# Patient Record
Sex: FEMALE | HISPANIC OR LATINO | Employment: UNEMPLOYED | URBAN - METROPOLITAN AREA
[De-identification: names, ages, dates, MRNs, and addresses within clinical notes are randomized per-mention and may not be internally consistent; named-entity substitution may affect disease eponyms.]

---

## 2022-04-19 ENCOUNTER — APPOINTMENT (OUTPATIENT)
Dept: GENERAL RADIOLOGY | Facility: HOSPITAL | Age: 57
End: 2022-04-19

## 2022-04-19 ENCOUNTER — HOSPITAL ENCOUNTER (OUTPATIENT)
Facility: HOSPITAL | Age: 57
Setting detail: OBSERVATION
Discharge: HOME | End: 2022-04-20
Attending: EMERGENCY MEDICINE | Admitting: HOSPITALIST

## 2022-04-19 ENCOUNTER — APPOINTMENT (OUTPATIENT)
Dept: CT IMAGING | Facility: HOSPITAL | Age: 57
End: 2022-04-19

## 2022-04-19 DIAGNOSIS — R10.84 GENERALIZED ABDOMINAL PAIN: Primary | ICD-10-CM

## 2022-04-19 DIAGNOSIS — R11.2 NAUSEA AND VOMITING, UNSPECIFIED VOMITING TYPE: ICD-10-CM

## 2022-04-19 DIAGNOSIS — E11.65 TYPE 2 DIABETES MELLITUS WITH HYPERGLYCEMIA, WITHOUT LONG-TERM CURRENT USE OF INSULIN: ICD-10-CM

## 2022-04-19 LAB
ALBUMIN SERPL-MCNC: 4.5 G/DL (ref 3.5–5.2)
ALBUMIN/GLOB SERPL: 1.5 G/DL
ALP SERPL-CCNC: 155 U/L (ref 39–117)
ALT SERPL W P-5'-P-CCNC: 18 U/L (ref 1–33)
ANION GAP SERPL CALCULATED.3IONS-SCNC: 12.8 MMOL/L (ref 5–15)
AST SERPL-CCNC: 18 U/L (ref 1–32)
BASOPHILS # BLD AUTO: 0.05 10*3/MM3 (ref 0–0.2)
BASOPHILS NFR BLD AUTO: 0.5 % (ref 0–1.5)
BILIRUB SERPL-MCNC: 0.2 MG/DL (ref 0–1.2)
BILIRUB UR QL STRIP: NEGATIVE
BUN SERPL-MCNC: 12 MG/DL (ref 6–20)
BUN/CREAT SERPL: 17.4 (ref 7–25)
CALCIUM SPEC-SCNC: 9.6 MG/DL (ref 8.6–10.5)
CHLORIDE SERPL-SCNC: 99 MMOL/L (ref 98–107)
CLARITY UR: CLEAR
CO2 SERPL-SCNC: 25.2 MMOL/L (ref 22–29)
COLOR UR: YELLOW
CREAT SERPL-MCNC: 0.69 MG/DL (ref 0.57–1)
DEPRECATED RDW RBC AUTO: 42.4 FL (ref 37–54)
EGFRCR SERPLBLD CKD-EPI 2021: 103.3 ML/MIN/1.73
EOSINOPHIL # BLD AUTO: 0.15 10*3/MM3 (ref 0–0.4)
EOSINOPHIL NFR BLD AUTO: 1.5 % (ref 0.3–6.2)
ERYTHROCYTE [DISTWIDTH] IN BLOOD BY AUTOMATED COUNT: 13.1 % (ref 12.3–15.4)
GLOBULIN UR ELPH-MCNC: 3 GM/DL
GLUCOSE SERPL-MCNC: 343 MG/DL (ref 65–99)
GLUCOSE UR STRIP-MCNC: ABNORMAL MG/DL
HCT VFR BLD AUTO: 44.7 % (ref 34–46.6)
HGB BLD-MCNC: 14.8 G/DL (ref 12–15.9)
HGB UR QL STRIP.AUTO: NEGATIVE
IMM GRANULOCYTES # BLD AUTO: 0.04 10*3/MM3 (ref 0–0.05)
IMM GRANULOCYTES NFR BLD AUTO: 0.4 % (ref 0–0.5)
INR PPP: 0.89 (ref 0.9–1.1)
KETONES UR QL STRIP: ABNORMAL
LEUKOCYTE ESTERASE UR QL STRIP.AUTO: NEGATIVE
LIPASE SERPL-CCNC: 25 U/L (ref 13–60)
LYMPHOCYTES # BLD AUTO: 1.49 10*3/MM3 (ref 0.7–3.1)
LYMPHOCYTES NFR BLD AUTO: 15.1 % (ref 19.6–45.3)
MAGNESIUM SERPL-MCNC: 1.7 MG/DL (ref 1.6–2.6)
MCH RBC QN AUTO: 29.4 PG (ref 26.6–33)
MCHC RBC AUTO-ENTMCNC: 33.1 G/DL (ref 31.5–35.7)
MCV RBC AUTO: 88.7 FL (ref 79–97)
MONOCYTES # BLD AUTO: 0.36 10*3/MM3 (ref 0.1–0.9)
MONOCYTES NFR BLD AUTO: 3.7 % (ref 5–12)
NEUTROPHILS NFR BLD AUTO: 7.75 10*3/MM3 (ref 1.7–7)
NEUTROPHILS NFR BLD AUTO: 78.8 % (ref 42.7–76)
NITRITE UR QL STRIP: NEGATIVE
NRBC BLD AUTO-RTO: 0 /100 WBC (ref 0–0.2)
PH UR STRIP.AUTO: <=5 [PH] (ref 5–8)
PLATELET # BLD AUTO: 309 10*3/MM3 (ref 140–450)
PMV BLD AUTO: 9.7 FL (ref 6–12)
POTASSIUM SERPL-SCNC: 4.3 MMOL/L (ref 3.5–5.2)
PROT SERPL-MCNC: 7.5 G/DL (ref 6–8.5)
PROT UR QL STRIP: NEGATIVE
PROTHROMBIN TIME: 12 SECONDS (ref 11.7–14.2)
RBC # BLD AUTO: 5.04 10*6/MM3 (ref 3.77–5.28)
SODIUM SERPL-SCNC: 137 MMOL/L (ref 136–145)
SP GR UR STRIP: >=1.03 (ref 1–1.03)
TROPONIN T SERPL-MCNC: <0.01 NG/ML (ref 0–0.03)
UROBILINOGEN UR QL STRIP: ABNORMAL
WBC NRBC COR # BLD: 9.84 10*3/MM3 (ref 3.4–10.8)

## 2022-04-19 PROCEDURE — 74177 CT ABD & PELVIS W/CONTRAST: CPT

## 2022-04-19 PROCEDURE — 93010 ELECTROCARDIOGRAM REPORT: CPT | Performed by: INTERNAL MEDICINE

## 2022-04-19 PROCEDURE — 99284 EMERGENCY DEPT VISIT MOD MDM: CPT

## 2022-04-19 PROCEDURE — 85025 COMPLETE CBC W/AUTO DIFF WBC: CPT | Performed by: EMERGENCY MEDICINE

## 2022-04-19 PROCEDURE — 25010000002 IOPAMIDOL 61 % SOLUTION: Performed by: EMERGENCY MEDICINE

## 2022-04-19 PROCEDURE — 83690 ASSAY OF LIPASE: CPT | Performed by: EMERGENCY MEDICINE

## 2022-04-19 PROCEDURE — 84484 ASSAY OF TROPONIN QUANT: CPT | Performed by: EMERGENCY MEDICINE

## 2022-04-19 PROCEDURE — 96375 TX/PRO/DX INJ NEW DRUG ADDON: CPT

## 2022-04-19 PROCEDURE — 81003 URINALYSIS AUTO W/O SCOPE: CPT | Performed by: EMERGENCY MEDICINE

## 2022-04-19 PROCEDURE — 83735 ASSAY OF MAGNESIUM: CPT | Performed by: EMERGENCY MEDICINE

## 2022-04-19 PROCEDURE — 93005 ELECTROCARDIOGRAM TRACING: CPT | Performed by: EMERGENCY MEDICINE

## 2022-04-19 PROCEDURE — 25010000002 ONDANSETRON PER 1 MG: Performed by: EMERGENCY MEDICINE

## 2022-04-19 PROCEDURE — 80053 COMPREHEN METABOLIC PANEL: CPT | Performed by: EMERGENCY MEDICINE

## 2022-04-19 PROCEDURE — 25010000002 HYDROMORPHONE PER 4 MG: Performed by: EMERGENCY MEDICINE

## 2022-04-19 PROCEDURE — 85610 PROTHROMBIN TIME: CPT | Performed by: EMERGENCY MEDICINE

## 2022-04-19 PROCEDURE — 71045 X-RAY EXAM CHEST 1 VIEW: CPT

## 2022-04-19 PROCEDURE — 96374 THER/PROPH/DIAG INJ IV PUSH: CPT

## 2022-04-19 RX ORDER — SODIUM CHLORIDE 0.9 % (FLUSH) 0.9 %
10 SYRINGE (ML) INJECTION AS NEEDED
Status: DISCONTINUED | OUTPATIENT
Start: 2022-04-19 | End: 2022-04-20 | Stop reason: HOSPADM

## 2022-04-19 RX ORDER — HYDROMORPHONE HYDROCHLORIDE 1 MG/ML
0.5 INJECTION, SOLUTION INTRAMUSCULAR; INTRAVENOUS; SUBCUTANEOUS ONCE
Status: COMPLETED | OUTPATIENT
Start: 2022-04-19 | End: 2022-04-19

## 2022-04-19 RX ORDER — ONDANSETRON 2 MG/ML
4 INJECTION INTRAMUSCULAR; INTRAVENOUS ONCE
Status: COMPLETED | OUTPATIENT
Start: 2022-04-19 | End: 2022-04-19

## 2022-04-19 RX ADMIN — ONDANSETRON 4 MG: 2 INJECTION INTRAMUSCULAR; INTRAVENOUS at 22:11

## 2022-04-19 RX ADMIN — SODIUM CHLORIDE 1000 ML: 9 INJECTION, SOLUTION INTRAVENOUS at 22:09

## 2022-04-19 RX ADMIN — HYDROMORPHONE HYDROCHLORIDE 0.5 MG: 1 INJECTION, SOLUTION INTRAMUSCULAR; INTRAVENOUS; SUBCUTANEOUS at 22:12

## 2022-04-19 RX ADMIN — IOPAMIDOL 85 ML: 612 INJECTION, SOLUTION INTRAVENOUS at 23:18

## 2022-04-20 VITALS
HEART RATE: 78 BPM | HEIGHT: 65 IN | WEIGHT: 185 LBS | DIASTOLIC BLOOD PRESSURE: 82 MMHG | OXYGEN SATURATION: 98 % | RESPIRATION RATE: 18 BRPM | SYSTOLIC BLOOD PRESSURE: 126 MMHG | BODY MASS INDEX: 30.82 KG/M2 | TEMPERATURE: 97.6 F

## 2022-04-20 PROBLEM — E11.9 TYPE 2 DIABETES MELLITUS, WITHOUT LONG-TERM CURRENT USE OF INSULIN: Status: ACTIVE | Noted: 2022-04-20

## 2022-04-20 PROBLEM — R10.84 GENERALIZED ABDOMINAL PAIN: Status: ACTIVE | Noted: 2022-04-20

## 2022-04-20 PROBLEM — E66.9 OBESITY (BMI 30-39.9): Status: ACTIVE | Noted: 2022-04-20

## 2022-04-20 PROBLEM — B37.31 VAGINAL YEAST INFECTION: Status: ACTIVE | Noted: 2022-04-20

## 2022-04-20 PROBLEM — E11.65 UNCONTROLLED TYPE 2 DIABETES MELLITUS WITH HYPERGLYCEMIA: Status: ACTIVE | Noted: 2022-04-20

## 2022-04-20 LAB
GLUCOSE BLDC GLUCOMTR-MCNC: 280 MG/DL (ref 70–130)
GLUCOSE BLDC GLUCOMTR-MCNC: 286 MG/DL (ref 70–130)
GLUCOSE BLDC GLUCOMTR-MCNC: 343 MG/DL (ref 70–130)
GLUCOSE BLDC GLUCOMTR-MCNC: 348 MG/DL (ref 70–130)
QT INTERVAL: 338 MS
SARS-COV-2 ORF1AB RESP QL NAA+PROBE: NOT DETECTED

## 2022-04-20 PROCEDURE — G0378 HOSPITAL OBSERVATION PER HR: HCPCS

## 2022-04-20 PROCEDURE — 63710000001 INSULIN LISPRO (HUMAN) PER 5 UNITS: Performed by: NURSE PRACTITIONER

## 2022-04-20 PROCEDURE — 96376 TX/PRO/DX INJ SAME DRUG ADON: CPT

## 2022-04-20 PROCEDURE — 82962 GLUCOSE BLOOD TEST: CPT

## 2022-04-20 PROCEDURE — 25010000002 HYDROMORPHONE PER 4 MG: Performed by: EMERGENCY MEDICINE

## 2022-04-20 PROCEDURE — U0004 COV-19 TEST NON-CDC HGH THRU: HCPCS | Performed by: EMERGENCY MEDICINE

## 2022-04-20 RX ORDER — HYDROMORPHONE HYDROCHLORIDE 1 MG/ML
0.5 INJECTION, SOLUTION INTRAMUSCULAR; INTRAVENOUS; SUBCUTANEOUS ONCE
Status: COMPLETED | OUTPATIENT
Start: 2022-04-20 | End: 2022-04-20

## 2022-04-20 RX ORDER — SODIUM CHLORIDE 0.9 % (FLUSH) 0.9 %
10 SYRINGE (ML) INJECTION AS NEEDED
Status: DISCONTINUED | OUTPATIENT
Start: 2022-04-20 | End: 2022-04-20 | Stop reason: HOSPADM

## 2022-04-20 RX ORDER — GLIPIZIDE 5 MG/1
5 TABLET ORAL
Qty: 60 TABLET | Refills: 0 | Status: SHIPPED | OUTPATIENT
Start: 2022-04-20

## 2022-04-20 RX ORDER — ACETAMINOPHEN 160 MG/5ML
650 SOLUTION ORAL EVERY 4 HOURS PRN
Status: DISCONTINUED | OUTPATIENT
Start: 2022-04-20 | End: 2022-04-20 | Stop reason: HOSPADM

## 2022-04-20 RX ORDER — KETOROLAC TROMETHAMINE 15 MG/ML
15 INJECTION, SOLUTION INTRAMUSCULAR; INTRAVENOUS EVERY 6 HOURS PRN
Status: DISCONTINUED | OUTPATIENT
Start: 2022-04-20 | End: 2022-04-20 | Stop reason: HOSPADM

## 2022-04-20 RX ORDER — ONDANSETRON 2 MG/ML
4 INJECTION INTRAMUSCULAR; INTRAVENOUS EVERY 6 HOURS PRN
Status: DISCONTINUED | OUTPATIENT
Start: 2022-04-20 | End: 2022-04-20 | Stop reason: HOSPADM

## 2022-04-20 RX ORDER — INSULIN LISPRO 100 [IU]/ML
5 INJECTION, SOLUTION INTRAVENOUS; SUBCUTANEOUS ONCE
Status: COMPLETED | OUTPATIENT
Start: 2022-04-20 | End: 2022-04-20

## 2022-04-20 RX ORDER — INSULIN LISPRO 100 [IU]/ML
0-9 INJECTION, SOLUTION INTRAVENOUS; SUBCUTANEOUS
Status: DISCONTINUED | OUTPATIENT
Start: 2022-04-20 | End: 2022-04-20 | Stop reason: HOSPADM

## 2022-04-20 RX ORDER — BLOOD SUGAR DIAGNOSTIC
STRIP MISCELLANEOUS
Qty: 100 EACH | Refills: 0 | Status: SHIPPED | OUTPATIENT
Start: 2022-04-20

## 2022-04-20 RX ORDER — LANCETS 30 GAUGE
EACH MISCELLANEOUS
Qty: 100 EACH | Refills: 0 | Status: SHIPPED | OUTPATIENT
Start: 2022-04-20

## 2022-04-20 RX ORDER — DEXTROSE MONOHYDRATE 25 G/50ML
25 INJECTION, SOLUTION INTRAVENOUS
Status: DISCONTINUED | OUTPATIENT
Start: 2022-04-20 | End: 2022-04-20 | Stop reason: HOSPADM

## 2022-04-20 RX ORDER — ACETAMINOPHEN 650 MG/1
650 SUPPOSITORY RECTAL EVERY 4 HOURS PRN
Status: DISCONTINUED | OUTPATIENT
Start: 2022-04-20 | End: 2022-04-20 | Stop reason: HOSPADM

## 2022-04-20 RX ORDER — FLUCONAZOLE 150 MG/1
150 TABLET ORAL ONCE
Status: COMPLETED | OUTPATIENT
Start: 2022-04-20 | End: 2022-04-20

## 2022-04-20 RX ORDER — SODIUM CHLORIDE 0.9 % (FLUSH) 0.9 %
10 SYRINGE (ML) INJECTION EVERY 12 HOURS SCHEDULED
Status: DISCONTINUED | OUTPATIENT
Start: 2022-04-20 | End: 2022-04-20 | Stop reason: HOSPADM

## 2022-04-20 RX ORDER — CALCIUM CARBONATE 200(500)MG
2 TABLET,CHEWABLE ORAL 2 TIMES DAILY PRN
Status: DISCONTINUED | OUTPATIENT
Start: 2022-04-20 | End: 2022-04-20 | Stop reason: HOSPADM

## 2022-04-20 RX ORDER — ACETAMINOPHEN 325 MG/1
650 TABLET ORAL EVERY 4 HOURS PRN
Status: DISCONTINUED | OUTPATIENT
Start: 2022-04-20 | End: 2022-04-20 | Stop reason: HOSPADM

## 2022-04-20 RX ORDER — ONDANSETRON 4 MG/1
4 TABLET, FILM COATED ORAL EVERY 6 HOURS PRN
Status: DISCONTINUED | OUTPATIENT
Start: 2022-04-20 | End: 2022-04-20 | Stop reason: HOSPADM

## 2022-04-20 RX ORDER — NICOTINE POLACRILEX 4 MG
15 LOZENGE BUCCAL
Status: DISCONTINUED | OUTPATIENT
Start: 2022-04-20 | End: 2022-04-20 | Stop reason: HOSPADM

## 2022-04-20 RX ADMIN — INSULIN LISPRO 6 UNITS: 100 INJECTION, SOLUTION INTRAVENOUS; SUBCUTANEOUS at 16:31

## 2022-04-20 RX ADMIN — INSULIN LISPRO 5 UNITS: 100 INJECTION, SOLUTION INTRAVENOUS; SUBCUTANEOUS at 03:45

## 2022-04-20 RX ADMIN — FLUCONAZOLE 150 MG: 150 TABLET ORAL at 16:22

## 2022-04-20 RX ADMIN — Medication 10 ML: at 08:59

## 2022-04-20 RX ADMIN — Medication 10 ML: at 03:46

## 2022-04-20 RX ADMIN — INSULIN LISPRO 4 UNITS: 100 INJECTION, SOLUTION INTRAVENOUS; SUBCUTANEOUS at 12:30

## 2022-04-20 RX ADMIN — HYDROMORPHONE HYDROCHLORIDE 0.5 MG: 1 INJECTION, SOLUTION INTRAMUSCULAR; INTRAVENOUS; SUBCUTANEOUS at 01:16

## 2022-04-20 RX ADMIN — INSULIN LISPRO 6 UNITS: 100 INJECTION, SOLUTION INTRAVENOUS; SUBCUTANEOUS at 08:41

## 2022-04-20 RX ADMIN — ACETAMINOPHEN 650 MG: 325 TABLET ORAL at 09:03

## 2022-04-20 RX ADMIN — ANTACID TABLETS 2 TABLET: 500 TABLET, CHEWABLE ORAL at 09:03

## 2022-04-20 NOTE — NURSING NOTE
Paged on call hospitality to see if they want me to start treating patients elevated blood sugar. Glucose just taken at bedside was 348

## 2022-04-20 NOTE — PLAN OF CARE
Goal Outcome Evaluation:              Outcome Evaluation: Pt for discharge, education done, needs attended, stable condition.

## 2022-04-20 NOTE — DISCHARGE SUMMARY
Patient Name: Mitra Wade  : 1965  MRN: [id number]    Date of Admission: 2022  Date of Discharge:  2022  Primary Care Physician: Provider, No Known      Chief Complaint:   Abdominal Pain      Discharge Diagnoses     Active Hospital Problems    Diagnosis  POA   • **Generalized abdominal pain [R10.84]  Yes   • Type 2 diabetes mellitus, without long-term current use of insulin (HCC) [E11.9]  Unknown   • Uncontrolled type 2 diabetes mellitus with hyperglycemia (HCC) [E11.65]  Unknown   • Vaginal yeast infection [B37.3]  Unknown   • Obesity (BMI 30-39.9) [E66.9]  Unknown      Resolved Hospital Problems   No resolved problems to display.        Hospital Course     Ms. Wade is a 56 y.o. female with a history of diabetes who presented to [facility] initially complaining of abdominal pain, nausea and vomiting.  Please see the admitting history and physical for further details.  She was found to have severe hyperglycemia and was admitted to the hospital for further evaluation and treatment.  She has a history of diabetes but has been noncompliant with her medications for the last 2 years.  She does not check her blood sugars at home.  She came in with some generalized abdominal discomfort, nausea and vomiting.  CT scan of the abdomen pelvis was negative.  She was made for observation and feels much better today.  Unclear as to the etiology of her symptoms.  Possibly diabetic gastroparesis but her symptoms seem to resolve pretty quickly.  No sick contacts or diarrhea.  Regardless her symptoms are much improved and she seems stable for discharge today.  She does have symptoms of possible yeast infection for which I will give her a dose of Diflucan prior to discharge.    Patient previously had good control of her diabetes with metformin and glipizide.  I will prescribe these medications today.  She has an appointment with a new PCP on May 5.  She will monitor her blood sugars and review  these with her PCP.  She was seen by our diabetic nurse educator who will provide her with the equipment and showed her how to check her blood sugar.  Also discussed the importance of diet and further weight loss with patient.  She verbalizes understanding.  She is agreeable to discharge home today.      Day of Discharge     Subjective:  Feels much better.  Wants to go home.    Physical Exam:  Temp:  [97.3 °F (36.3 °C)-98.3 °F (36.8 °C)] 98.3 °F (36.8 °C)  Heart Rate:  [] 78  Resp:  [18] 18  BP: (118-139)/(69-98) 118/74  Body mass index is 30.79 kg/m².  Physical Exam  Constitutional:       General: She is not in acute distress.     Appearance: She is obese. She is not diaphoretic.   Cardiovascular:      Rate and Rhythm: Normal rate and regular rhythm.      Heart sounds: Normal heart sounds.   Pulmonary:      Effort: Pulmonary effort is normal.      Breath sounds: Normal breath sounds.   Abdominal:      General: Bowel sounds are normal.      Palpations: Abdomen is soft.      Tenderness: There is no abdominal tenderness.   Musculoskeletal:         General: No swelling.   Skin:     General: Skin is warm and dry.   Neurological:      Mental Status: She is alert and oriented to person, place, and time.         Consultants     Consult Orders (all) (From admission, onward)     Start     Ordered    04/20/22 1424  Inpatient Nutrition Consult  Once        Comments: Per pt request.  Please focus on carb counting to give pt individualized choices as she is prohibiting certain foods based on possible misinformation,  Thank you!   Provider:  (Not yet assigned)    04/20/22 1424    04/20/22 0104  Inpatient Diabetes Educator Consult  Once        Provider:  (Not yet assigned)    04/20/22 0104              Procedures     Imaging Results (All)     Procedure Component Value Units Date/Time    CT Abdomen Pelvis With Contrast [[id number]] Collected: 04/19/22 2343     Updated: 04/19/22 6164    Narrative:      CT OF THE ABDOMEN AND  PELVIS WITH CONTRAST     HISTORY: Left abdominal pain     COMPARISON: None available.     TECHNIQUE: Axial CT imaging was obtained through the abdomen and pelvis.  IV contrast was administered.     FINDINGS:  Images through the lung bases are clear. No suspicious hepatic lesions  are seen. Gallbladder is surgically absent. The stomach and duodenum  appear normal, as are the spleen, adrenal glands, and pancreas. The  kidneys enhance symmetrically. There is no hydronephrosis. No distal  ureteral or bladder stones are seen. Uterus is surgically absent. There  are bilateral tubal ligation clips. There is no bowel obstruction. The  appendix is normal. There is moderate anterolisthesis of L5 on S1.       Impression:      No acute intra-abdominal or intrapelvic process identified.     Radiation dose reduction techniques were utilized, including automated  exposure control and exposure modulation based on body size.     This report was finalized on 4/19/2022 11:54 PM by Dr. Liz Potts M.D.       XR Chest 1 View [[id number]] Collected: 04/19/22 2217     Updated: 04/19/22 2221    Narrative:      SINGLE VIEW OF THE CHEST     HISTORY: Right-sided abdominal pain     COMPARISON: None available.     FINDINGS:  Heart size is within normal limits. No pneumothorax, pleural effusion,  or acute infiltrate is seen. No free air is seen beneath the diaphragm.       Impression:      No acute findings.     This report was finalized on 4/19/2022 10:18 PM by Dr. Liz Potts M.D.               Pertinent Labs     Results from last 7 days   Lab Units 04/19/22  2205   WBC 10*3/mm3 9.84   HEMOGLOBIN g/dL 14.8   PLATELETS 10*3/mm3 309     Results from last 7 days   Lab Units 04/19/22  2205   SODIUM mmol/L 137   POTASSIUM mmol/L 4.3   CHLORIDE mmol/L 99   CO2 mmol/L 25.2   BUN mg/dL 12   CREATININE mg/dL 0.69   GLUCOSE mg/dL 343*   EGFR mL/min/1.73 103.3     Results from last 7 days   Lab Units 04/19/22  2205   ALBUMIN g/dL 4.50    BILIRUBIN mg/dL 0.2   ALK PHOS U/L 155*   AST (SGOT) U/L 18   ALT (SGPT) U/L 18     Results from last 7 days   Lab Units 04/19/22  2205   CALCIUM mg/dL 9.6   ALBUMIN g/dL 4.50   MAGNESIUM mg/dL 1.7     Results from last 7 days   Lab Units 04/19/22  2205   LIPASE U/L 25     Results from last 7 days   Lab Units 04/19/22  2205   TROPONIN T ng/mL <0.010           Invalid input(s): LDLCALC      Results from last 7 days   Lab Units 04/20/22  0056   COVID19  Not Detected       Test Results Pending at Discharge       Discharge Details        Discharge Medications      New Medications      Instructions Start Date   Alcohol Pads 70 % pads   Apply one alcohol swab to injection site of skin immediately prior to insulin injection. Formulary Compliance Approval.      glipizide 5 MG tablet  Commonly known as: Glucotrol   5 mg, Oral, 2 Times Daily Before Meals      glucose blood test strip   Use to test blood glucose up to four times daily as needed. Formulary Compliance Approval. Diagnosis: Type 2 Diabetes - Not Insulin Dependent      Lancets misc   Use to test blood glucose up to four times daily as needed. Formulary Compliance Approval. Diagnosis: Type 2 Diabetes - Not Insulin Dependent      metFORMIN 500 MG tablet  Commonly known as: Glucophage   500 mg, Oral, 2 Times Daily With Meals             No Known Allergies    Discharge Disposition:  Home or Self Care      Discharge Diet:  Diet Order   Procedures   • Diet Regular; Consistent Carbohydrate       Discharge Activity:   Activity Instructions     Activity as Tolerated            CODE STATUS:    Code Status and Medical Interventions:   Ordered at: 04/20/22 0103     Code Status (Patient has no pulse and is not breathing):    CPR (Attempt to Resuscitate)     Medical Interventions (Patient has pulse or is breathing):    Full Support       No future appointments.  Additional Instructions for the Follow-ups that You Need to Schedule     Discharge Follow-up with PCP   As  directed       Currently Documented PCP:    Provider, No Known    PCP Phone Number:    [phone]     Follow Up Details: 1 to 2 weeks (or sooner if problems)            Follow-up Information     Provider, No Known .    Why: 1 to 2 weeks (or sooner if problems)  Contact information:  [facility] [address]  [phone]                         Additional Instructions for the Follow-ups that You Need to Schedule     Discharge Follow-up with PCP   As directed       Currently Documented PCP:    Provider, No Known    PCP Phone Number:    [phone]     Follow Up Details: 1 to 2 weeks (or sooner if problems)               Rock Hanna MD  [facility]  04/20/22  14:42 EDT

## 2022-04-20 NOTE — CONSULTS
"Diabetes Education  Assessment/Teaching    Patient Name:  Mitra Wade  YOB: 1965  MRN: [id number]  Admit Date:  4/19/2022      Assessment Date:  4/20/2022  Flowsheet Row Most Recent Value   General Information     Referral From: MD aguero   Height 165.1 cm (65\")   Height Method Stated   Weight 83.9 kg (185 lb)   Weight Method Stated   Diabetes History    What type of diabetes do you have? Type 2   Length of Diabetes Diagnosis 6 - 10 years  [Pt states 10 years]   Current DM knowledge fair   Have you had diabetes education/teaching in the past? yes   When and where was your diabetes education? Pt states has had education in the past   Education Preferences    Barriers to Learning other (comment)  [None noted]   Assessment Topics    Healthy Eating - Assessment Needs education   Reducing Risk - Assessment Needs education   Monitoring - Assessment Needs education   DM Goals    Taking Medication - Goal 0-7 days from discharge   Reducing Risk - Goal 30-90 days from discharge   Monitoring - Goal 0-7 days from discharge   Contact Plan Follow-up medical care          Flowsheet Row Most Recent Value   DM Education Needs    Meter Meter provided   Meter Type One Touch  [Verio Reflect w/ Delica lancets]   Frequency of Testing 2 times a day   Blood Glucose Target Range  mg/dL premeal and less than 180 postmeal   Medication --  [Pt states she was previously on oral agents.  Pt states when she was taking them, they were effective.]   Reducing Risks A1C testing   Healthy Eating RD consult  [per pt request]   Healthy Coping Appropriate   Discharge Plan Home, Follow-up with PCP   Motivation Engaged   Teaching Method Discussion, Explanation, Handouts   Patient Response Verbalized understanding        Electronically signed by:  Alisa Doe RN, [facility]  04/20/22 14:31 EDT  "

## 2022-04-20 NOTE — ED PROVIDER NOTES
EMERGENCY DEPARTMENT ENCOUNTER    Room Number:  34/34  Date of encounter:  4/20/2022  PCP: Provider, No Known  Historian: Patient and significant other      HPI:  Chief Complaint: Abdominal pain  A complete HPI/ROS/PMH/PSH/SH/FH are unobtainable due to: Not applicable  Context: Mitra Wade is a 56 y.o. female who presents to the ED c/o patient started having some abdominal pain about 7 PM tonight.  She felt a little upset of her stomach earlier than that.  She had a small amount of meal.  Soon after that the pain became worse.  She then started vomiting.  Has had 4 episodes of vomiting prior to arrival here.  Reports no diarrhea.  Pain seems to be more in the lower portion of her abdomen.  She describes it as a crampy gas pain.  Unaware of anything that makes the pain worse or better.  She has had kidney stones in the past.  She is also had a hysterectomy.  She still has her gallbladder and appendix.  Denies any dysuria or urinary frequency.  Denies any fever, chest pain, shortness of breath.        Previous Episodes: Not entirely the same as this but has had episodic abdominal pain in the past.  Current Symptoms: See above    MEDICAL HISTORY REVIEWED        PAST MEDICAL HISTORY  Active Ambulatory Problems     Diagnosis Date Noted   • No Active Ambulatory Problems     Resolved Ambulatory Problems     Diagnosis Date Noted   • No Resolved Ambulatory Problems     No Additional Past Medical History         PAST SURGICAL HISTORY  No past surgical history on file.      FAMILY HISTORY  No family history on file.      SOCIAL HISTORY  Social History     Socioeconomic History   • Marital status:          ALLERGIES  Patient has no known allergies.        REVIEW OF SYSTEMS  Review of Systems     All systems reviewed and negative except for those discussed in HPI.       PHYSICAL EXAM    I have reviewed the triage vital signs and nursing notes.    ED Triage Vitals [04/19/22 2050]   Temp Heart Rate Resp BP SpO2   97.5 °F  (36.4 °C) 109 18 139/98 99 %      Temp src Heart Rate Source Patient Position BP Location FiO2 (%)   Tympanic Monitor Sitting Left arm --       GENERAL: Female that appears uncomfortable, but no acute distress.Vital signs on my initial evaluation unremarkable.  On my exam her heart rate is normal.  HENT: nares patent  Head/neck/ face are symmetric without gross deformity, signs of trauma, or swelling  EYES: no scleral icterus, no conjunctival pallor.  NECK: Supple, no meningismus  CV: regular rhythm, regular rate with intact distal pulses.  No murmur or rub  RESPIRATORY: normal effort and no respiratory distress.  Clear to auscultation bilaterally  ABDOMEN: soft and morbidly obese.  Has generalized abdominal pain but is greater in the lower portion of her abdomen.  Negative Stafford sign.  She has normal bowel sounds.  There is no guarding.  MUSCULOSKELETAL: no deformity.  Intact distal pulses to upper and lower extremities that are equal strong and symmetric.  NEURO: alert and appropriate, moves all extremities, follows commands.  No focal motor or sensory changes.  SKIN: warm, dry    Vital signs and nursing notes reviewed.        LAB RESULTS  Recent Results (from the past 24 hour(s))   ECG 12 Lead    Collection Time: 04/19/22  9:42 PM   Result Value Ref Range    QT Interval 338 ms   Urinalysis With Microscopic If Indicated (No Culture) - Urine, Clean Catch    Collection Time: 04/19/22  9:43 PM    Specimen: Urine, Clean Catch   Result Value Ref Range    Color, UA Yellow Yellow, Straw    Appearance, UA Clear Clear    pH, UA <=5.0 5.0 - 8.0    Specific Gravity, UA >=1.030 1.005 - 1.030    Glucose, UA >=1000 mg/dL (3+) (A) Negative    Ketones, UA Trace (A) Negative    Bilirubin, UA Negative Negative    Blood, UA Negative Negative    Protein, UA Negative Negative    Leuk Esterase, UA Negative Negative    Nitrite, UA Negative Negative    Urobilinogen, UA 0.2 E.U./dL 0.2 - 1.0 E.U./dL   Comprehensive Metabolic Panel     Collection Time: 04/19/22 10:05 PM    Specimen: Blood   Result Value Ref Range    Glucose 343 (H) 65 - 99 mg/dL    BUN 12 6 - 20 mg/dL    Creatinine 0.69 0.57 - 1.00 mg/dL    Sodium 137 136 - 145 mmol/L    Potassium 4.3 3.5 - 5.2 mmol/L    Chloride 99 98 - 107 mmol/L    CO2 25.2 22.0 - 29.0 mmol/L    Calcium 9.6 8.6 - 10.5 mg/dL    Total Protein 7.5 6.0 - 8.5 g/dL    Albumin 4.50 3.50 - 5.20 g/dL    ALT (SGPT) 18 1 - 33 U/L    AST (SGOT) 18 1 - 32 U/L    Alkaline Phosphatase 155 (H) 39 - 117 U/L    Total Bilirubin 0.2 0.0 - 1.2 mg/dL    Globulin 3.0 gm/dL    A/G Ratio 1.5 g/dL    BUN/Creatinine Ratio 17.4 7.0 - 25.0    Anion Gap 12.8 5.0 - 15.0 mmol/L    eGFR 103.3 >60.0 mL/min/1.73   Protime-INR    Collection Time: 04/19/22 10:05 PM    Specimen: Blood   Result Value Ref Range    Protime 12.0 11.7 - 14.2 Seconds    INR 0.89 (L) 0.90 - 1.10   Lipase    Collection Time: 04/19/22 10:05 PM    Specimen: Blood   Result Value Ref Range    Lipase 25 13 - 60 U/L   Magnesium    Collection Time: 04/19/22 10:05 PM    Specimen: Blood   Result Value Ref Range    Magnesium 1.7 1.6 - 2.6 mg/dL   Troponin    Collection Time: 04/19/22 10:05 PM    Specimen: Blood   Result Value Ref Range    Troponin T <0.010 0.000 - 0.030 ng/mL   CBC Auto Differential    Collection Time: 04/19/22 10:05 PM    Specimen: Blood   Result Value Ref Range    WBC 9.84 3.40 - 10.80 10*3/mm3    RBC 5.04 3.77 - 5.28 10*6/mm3    Hemoglobin 14.8 12.0 - 15.9 g/dL    Hematocrit 44.7 34.0 - 46.6 %    MCV 88.7 79.0 - 97.0 fL    MCH 29.4 26.6 - 33.0 pg    MCHC 33.1 31.5 - 35.7 g/dL    RDW 13.1 12.3 - 15.4 %    RDW-SD 42.4 37.0 - 54.0 fl    MPV 9.7 6.0 - 12.0 fL    Platelets 309 140 - 450 10*3/mm3    Neutrophil % 78.8 (H) 42.7 - 76.0 %    Lymphocyte % 15.1 (L) 19.6 - 45.3 %    Monocyte % 3.7 (L) 5.0 - 12.0 %    Eosinophil % 1.5 0.3 - 6.2 %    Basophil % 0.5 0.0 - 1.5 %    Immature Grans % 0.4 0.0 - 0.5 %    Neutrophils, Absolute 7.75 (H) 1.70 - 7.00 10*3/mm3     Lymphocytes, Absolute 1.49 0.70 - 3.10 10*3/mm3    Monocytes, Absolute 0.36 0.10 - 0.90 10*3/mm3    Eosinophils, Absolute 0.15 0.00 - 0.40 10*3/mm3    Basophils, Absolute 0.05 0.00 - 0.20 10*3/mm3    Immature Grans, Absolute 0.04 0.00 - 0.05 10*3/mm3    nRBC 0.0 0.0 - 0.2 /100 WBC       Ordered the above labs and independently reviewed the results.        RADIOLOGY  CT Abdomen Pelvis With Contrast    Result Date: 4/19/2022  CT OF THE ABDOMEN AND PELVIS WITH CONTRAST  HISTORY: Left abdominal pain  COMPARISON: None available.  TECHNIQUE: Axial CT imaging was obtained through the abdomen and pelvis. IV contrast was administered.  FINDINGS: Images through the lung bases are clear. No suspicious hepatic lesions are seen. Gallbladder is surgically absent. The stomach and duodenum appear normal, as are the spleen, adrenal glands, and pancreas. The kidneys enhance symmetrically. There is no hydronephrosis. No distal ureteral or bladder stones are seen. Uterus is surgically absent. There are bilateral tubal ligation clips. There is no bowel obstruction. The appendix is normal. There is moderate anterolisthesis of L5 on S1.      No acute intra-abdominal or intrapelvic process identified.  Radiation dose reduction techniques were utilized, including automated exposure control and exposure modulation based on body size.  This report was finalized on 4/19/2022 11:54 PM by Dr. Liz Potts M.D.      XR Chest 1 View    Result Date: 4/19/2022  SINGLE VIEW OF THE CHEST  HISTORY: Right-sided abdominal pain  COMPARISON: None available.  FINDINGS: Heart size is within normal limits. No pneumothorax, pleural effusion, or acute infiltrate is seen. No free air is seen beneath the diaphragm.      No acute findings.  This report was finalized on 4/19/2022 10:18 PM by Dr. Liz Potts M.D.        I ordered the above noted radiological studies. Reviewed by me and discussed with radiologist.  See dictation for official radiology  interpretation.      PROCEDURES    Procedures      MEDICATIONS GIVEN IN ER    Medications   sodium chloride 0.9 % flush 10 mL (has no administration in time range)   sodium chloride 0.9 % bolus 1,000 mL (1,000 mL Intravenous New Bag 4/19/22 2209)   HYDROmorphone (DILAUDID) injection 0.5 mg (0.5 mg Intravenous Given 4/19/22 2212)   ondansetron (ZOFRAN) injection 4 mg (4 mg Intravenous Given 4/19/22 2211)   iopamidol (ISOVUE-300) 61 % injection 100 mL (85 mL Intravenous Given 4/19/22 2318)         PROGRESS, DATA ANALYSIS, CONSULTS, AND MEDICAL DECISION MAKING    Differential diagnosis includes   - hepatobiliary pathology such as cholecystitis, cholangitis, and symptomatic cholelithiasis  -PUD  -Mesenteric ischemia  - Pancreatitis  - Dyspepsia  - Small bowel or large bowel obstruction  - Appendicitis  - Diverticulitis  - UTI including pyelonephritis  - Ureteral stone  - Zoster  - Colitis, including infectious and ischemic  - Atypical ACS  Patient is requesting medicine for pain with over some pain medicine, IV fluids, as well as nausea medicine.  Informed her of the test that we will order.  All questions answered at this time.    All labs have been independently reviewed by me.  All radiology studies have been reviewed by me and discussed with radiologist dictating the report.   EKG's independently viewed and interpreted by me.  Discussion below represents my analysis of pertinent findings related to patient's condition, differential diagnosis, treatment plan and final disposition.      ED Course as of 04/20/22 0031   Tue Apr 19, 2022   7143 I spoke with Shelia who is on for [facility].  She is the midlevel provider.  Explained to her the patient's initial presenting symptoms and results of lab work and CT scan.  She will take care of ordering appropriate insulin.  All questions answered at this time. [MM]   2318 Glucose(!): 343 [MM]   2358 Patient currently is not on any medicines.  She was on diabetic medicine in the  past.  Has not taken diabetic medicine in quite a while.  She has not seen a doctor in over 2 years.  She could not get the medicine so she decided to stop it.  Currently on reevaluation she is hemodynamically stable and the pain has improved some. [MM]   2139 The CT scan report was reviewed.  No acute intra-abdominal process appreciated.  I reviewed the radiologist report.  Please see complete dictated report from radiologist. [MM]   Wed Apr 20, 2022   0000 Chest x-ray is unremarkable. [MM]   0003 EKG reading  EKG was done at 2142  's a rate of 111 and is a sinus tachycardia  Narrow complex  No acute injury pattern appreciated.  Patient has Q waves 3 aVF V1 and V2  QT looks normal  No old EKG to compare with. [MM]      ED Course User Index  [MM] Santy Kruger MD       AS OF 00:31 EDT VITALS:    BP - 139/98  HR - 109  TEMP - 97.5 °F (36.4 °C) (Tympanic)  02 SATS - 99%        DIAGNOSIS  Final diagnoses:   Generalized abdominal pain   Nausea and vomiting, unspecified vomiting type   Type 2 diabetes mellitus with hyperglycemia, without long-term current use of insulin (HCC)         DISPOSITION  I have reviewed the test results with my patient and explained the current treatment plan.  I answered all the patient's questions and concerns.  The patient is hemodynamically stable and is in no distress and is appropriate to be admitted to a medical/surgical floor bed at this time.            DICTATED UTILIZING DRAGON DICTATION     Santy Kruger MD  04/20/22 0038

## 2022-04-20 NOTE — ED TRIAGE NOTES
Pt c/o upper abd pain and n/v that started approx 1hr ago. Denies fever      Pt wearing mask on arrival. Staff wearing mask and goggles at time of triage.

## 2022-04-20 NOTE — PLAN OF CARE
Goal Outcome Evaluation:            Patient remained free of any signs or symptoms of distress since she arrived to the floor. Patients glucose was 348 on arrival so [facility] was paged to start treatment and an order for 5 units of insulin was given. Patient is eager to manage her diabetes better now at home.

## 2022-04-20 NOTE — H&P
Patient Name:  Mitra Wade  YOB: 1965  MRN:  [id number]  Admit Date:  4/19/2022  Patient Care Team:  Provider, No Known as PCP - General      Subjective   History Present Illness     Chief Complaint   Patient presents with   • Abdominal Pain       Ms. Wade is a 56 y.o. non-smoker with a history of type 2 diabetes mellitus that presents to [facility] complaining of abdominal pain.  She reports abdominal pain that began today this afternoon.  She describes the pain as constant, moderate, and aching in nature.  She reports associated nausea and non-bloody emesis.  She denies fever, chills, chest pain, and shortness of breath.  She states she has a history of type 2 diabetes mellitus and she was previously taking Metformin and Glyburide but she has not taken the medications for the past 2 years.  She states there was mix up between her PCP and the pharmacy at the time so she became frustrated and never went to  the medications.  She reports recent polydipsia, polyphagia, and polyuria.  Her blood sugar was found to be  343 in the ED.  A CT of the abdomen/pelvis was negative for an acute process.        History of Present Illness  Review of Systems   Constitutional: Negative for chills and fever.   HENT: Negative for congestion and sore throat.    Eyes: Positive for visual disturbance. Negative for photophobia.   Respiratory: Negative for cough and shortness of breath.    Cardiovascular: Negative for chest pain, palpitations and leg swelling.   Gastrointestinal: Positive for abdominal pain, nausea and vomiting.   Endocrine: Positive for polydipsia, polyphagia and polyuria.   Genitourinary: Positive for frequency. Negative for decreased urine volume, dysuria and flank pain.   Neurological: Negative for dizziness, light-headedness and headaches.        Personal History     Past Medical History:   Diagnosis Date   • Diabetes mellitus (HCC)      History reviewed. No pertinent  surgical history.  History reviewed. No pertinent family history.  Social History     Tobacco Use   • Smoking status: Never Smoker   • Smokeless tobacco: Never Used   Substance Use Topics   • Drug use: Never     No medications prior to admission.     Allergies:  No Known Allergies    Objective    Objective     Vital Signs  Temp:  [97.5 °F (36.4 °C)] 97.5 °F (36.4 °C)  Heart Rate:  [] 96  Resp:  [18] 18  BP: (118-139)/(69-98) 118/78  SpO2:  [96 %-99 %] 97 %  on   ;   Device (Oxygen Therapy): room air  Body mass index is 30.79 kg/m².    Physical Exam  Vitals and nursing note reviewed.   Constitutional:       Appearance: She is obese.   HENT:      Head: Normocephalic and atraumatic.      Nose: Nose normal.      Mouth/Throat:      Mouth: Mucous membranes are dry.      Pharynx: Oropharynx is clear.   Eyes:      Extraocular Movements: Extraocular movements intact.      Conjunctiva/sclera: Conjunctivae normal.   Cardiovascular:      Rate and Rhythm: Normal rate and regular rhythm.      Pulses: Normal pulses.      Heart sounds: Normal heart sounds.   Pulmonary:      Effort: Pulmonary effort is normal.      Breath sounds: Normal breath sounds.   Abdominal:      General: Bowel sounds are normal. There is no distension.      Palpations: Abdomen is soft. There is no mass.      Tenderness: There is abdominal tenderness. There is no guarding or rebound.      Hernia: No hernia is present.   Musculoskeletal:         General: No swelling. Normal range of motion.      Cervical back: Normal range of motion and neck supple.   Skin:     General: Skin is warm and dry.   Neurological:      General: No focal deficit present.      Mental Status: She is alert and oriented to person, place, and time.   Psychiatric:         Mood and Affect: Mood normal.         Behavior: Behavior normal.         Results Review:  I reviewed the patient's new clinical results.  I reviewed the patient's new imaging results and agree with the  interpretation.  I reviewed the patient's other test results and agree with the interpretation  I personally viewed and interpreted the patient's EKG/Telemetry data  Discussed with ED provider.    Lab Results (last 24 hours)     Procedure Component Value Units Date/Time    Urinalysis With Microscopic If Indicated (No Culture) - Urine, Clean Catch [[id number]]  (Abnormal) Collected: 04/19/22 2143    Specimen: Urine, Clean Catch Updated: 04/19/22 2157     Color, UA Yellow     Appearance, UA Clear     pH, UA <=5.0     Specific Gravity, UA >=1.030     Glucose, UA >=1000 mg/dL (3+)     Ketones, UA Trace     Bilirubin, UA Negative     Blood, UA Negative     Protein, UA Negative     Leuk Esterase, UA Negative     Nitrite, UA Negative     Urobilinogen, UA 0.2 E.U./dL    Narrative:      Urine microscopic not indicated.    CBC & Differential [[id number]]  (Abnormal) Collected: 04/19/22 2205    Specimen: Blood Updated: 04/19/22 2216    Narrative:      The following orders were created for panel order CBC & Differential.  Procedure                               Abnormality         Status                     ---------                               -----------         ------                     CBC Auto Differential[[id number]]        Abnormal            Final result                 Please view results for these tests on the individual orders.    Comprehensive Metabolic Panel [[id number]]  (Abnormal) Collected: 04/19/22 2205    Specimen: Blood Updated: 04/19/22 2258     Glucose 343 mg/dL      BUN 12 mg/dL      Creatinine 0.69 mg/dL      Sodium 137 mmol/L      Potassium 4.3 mmol/L      Comment: Slight hemolysis detected by analyzer. Results may be affected.        Chloride 99 mmol/L      CO2 25.2 mmol/L      Calcium 9.6 mg/dL      Total Protein 7.5 g/dL      Albumin 4.50 g/dL      ALT (SGPT) 18 U/L      AST (SGOT) 18 U/L      Comment: Slight hemolysis detected by analyzer. Results may be affected.        Alkaline Phosphatase 155 U/L       Total Bilirubin 0.2 mg/dL      Globulin 3.0 gm/dL      A/G Ratio 1.5 g/dL      BUN/Creatinine Ratio 17.4     Anion Gap 12.8 mmol/L      eGFR 103.3 mL/min/1.73      Comment: National Kidney Foundation and American Society of Nephrology (ASN) Task Force recommended calculation based on the Chronic Kidney Disease Epidemiology Collaboration (CKD-EPI) equation refit without adjustment for race.       Narrative:      GFR Normal >60  Chronic Kidney Disease <60  Kidney Failure <15      Protime-INR [[id number]]  (Abnormal) Collected: 04/19/22 2205    Specimen: Blood Updated: 04/19/22 2321     Protime 12.0 Seconds      INR 0.89    Lipase [[id number]]  (Normal) Collected: 04/19/22 2205    Specimen: Blood Updated: 04/19/22 2241     Lipase 25 U/L     Magnesium [[id number]]  (Normal) Collected: 04/19/22 2205    Specimen: Blood Updated: 04/19/22 2241     Magnesium 1.7 mg/dL     Troponin [[id number]]  (Normal) Collected: 04/19/22 2205    Specimen: Blood Updated: 04/19/22 2241     Troponin T <0.010 ng/mL     Narrative:      Troponin T Reference Range:  <= 0.03 ng/mL-   Negative for AMI  >0.03 ng/mL-     Abnormal for myocardial necrosis.  Clinicians would have to utilize clinical acumen, EKG, Troponin and serial changes to determine if it is an Acute Myocardial Infarction or myocardial injury due to an underlying chronic condition.       Results may be falsely decreased if patient taking Biotin.      CBC Auto Differential [[id number]]  (Abnormal) Collected: 04/19/22 2205    Specimen: Blood Updated: 04/19/22 2216     WBC 9.84 10*3/mm3      RBC 5.04 10*6/mm3      Hemoglobin 14.8 g/dL      Hematocrit 44.7 %      MCV 88.7 fL      MCH 29.4 pg      MCHC 33.1 g/dL      RDW 13.1 %      RDW-SD 42.4 fl      MPV 9.7 fL      Platelets 309 10*3/mm3      Neutrophil % 78.8 %      Lymphocyte % 15.1 %      Monocyte % 3.7 %      Eosinophil % 1.5 %      Basophil % 0.5 %      Immature Grans % 0.4 %      Neutrophils, Absolute 7.75 10*3/mm3       Lymphocytes, Absolute 1.49 10*3/mm3      Monocytes, Absolute 0.36 10*3/mm3      Eosinophils, Absolute 0.15 10*3/mm3      Basophils, Absolute 0.05 10*3/mm3      Immature Grans, Absolute 0.04 10*3/mm3      nRBC 0.0 /100 WBC     COVID PRE-OP / PRE-PROCEDURE SCREENING ORDER (NO ISOLATION) - Swab, Nasopharynx [[id number]] Collected: 04/20/22 0056    Specimen: Swab from Nasopharynx Updated: 04/20/22 0105    Narrative:      The following orders were created for panel order COVID PRE-OP / PRE-PROCEDURE SCREENING ORDER (NO ISOLATION) - Swab, Nasopharynx.  Procedure                               Abnormality         Status                     ---------                               -----------         ------                     COVID-19,APTIMA PANTHER(...[[id number]]                      In process                   Please view results for these tests on the individual orders.    COVID-19,APTIMA PANTHER(ANA), MANDA/ GILBERT, NP/OP SWAB IN UTM/VTM/SALINE TRANSPORT MEDIA,24 HR TAT - Swab, Nasopharynx [[id number]] Collected: 04/20/22 0056    Specimen: Swab from Nasopharynx Updated: 04/20/22 0105    POC Glucose Once [[id number]]  (Abnormal) Collected: 04/20/22 0235    Specimen: Blood Updated: 04/20/22 0236     Glucose 348 mg/dL      Comment: Meter: [id number] : ADIA Wetzel RN             Imaging Results (Last 24 Hours)     Procedure Component Value Units Date/Time    CT Abdomen Pelvis With Contrast [[id number]] Collected: 04/19/22 2343     Updated: 04/19/22 2357    Narrative:      CT OF THE ABDOMEN AND PELVIS WITH CONTRAST     HISTORY: Left abdominal pain     COMPARISON: None available.     TECHNIQUE: Axial CT imaging was obtained through the abdomen and pelvis.  IV contrast was administered.     FINDINGS:  Images through the lung bases are clear. No suspicious hepatic lesions  are seen. Gallbladder is surgically absent. The stomach and duodenum  appear normal, as are the spleen, adrenal glands, and pancreas. The  kidneys  enhance symmetrically. There is no hydronephrosis. No distal  ureteral or bladder stones are seen. Uterus is surgically absent. There  are bilateral tubal ligation clips. There is no bowel obstruction. The  appendix is normal. There is moderate anterolisthesis of L5 on S1.       Impression:      No acute intra-abdominal or intrapelvic process identified.     Radiation dose reduction techniques were utilized, including automated  exposure control and exposure modulation based on body size.     This report was finalized on 4/19/2022 11:54 PM by Dr. Liz Potts M.D.       XR Chest 1 View [[id number]] Collected: 04/19/22 2217     Updated: 04/19/22 2221    Narrative:      SINGLE VIEW OF THE CHEST     HISTORY: Right-sided abdominal pain     COMPARISON: None available.     FINDINGS:  Heart size is within normal limits. No pneumothorax, pleural effusion,  or acute infiltrate is seen. No free air is seen beneath the diaphragm.       Impression:      No acute findings.     This report was finalized on 4/19/2022 10:18 PM by Dr. Liz Potts M.D.                 ECG 12 Lead   Preliminary Result   HEART RATE= 111  bpm   RR Interval= 540  ms   NH Interval= 147  ms   P Horizontal Axis= 2  deg   P Front Axis= 34  deg   QRSD Interval= 83  ms   QT Interval= 338  ms   QRS Axis= -22  deg   T Wave Axis= 7  deg   - BORDERLINE ECG -   Sinus tachycardia   Probable left atrial enlargement   Borderline left axis deviation   Low voltage, precordial leads   Electronically Signed By:    Date and Time of Study: 2022-04-19 21:42:42           Assessment/Plan     Active Hospital Problems    Diagnosis  POA   • **Generalized abdominal pain [R10.84]  Yes   • Type 2 diabetes mellitus, without long-term current use of insulin (HCC) [E11.9]  Unknown   • Uncontrolled type 2 diabetes mellitus with hyperglycemia (HCC) [E11.65]  Unknown       Generalized Abdominal Pain  -Secondary to gastroparesis?  -No acute process on CT  -PRN analgesia and  antiemetics  -IVF  -Repeat lab work in AM    Uncontrolled Type 2 Diabetes Mellitus  -Initiate correctional factor insulin  -Monitor blood sugars ACHS  -Check hgb A1C  -Consult diabetes educator  -NCS diet      -I discussed the patients findings and my recommendations with patient.    VTE Prophylaxis - SCDs.  Code Status - Full code.       LAYO Solorzano  [facility]  04/20/22  03:36 EDT

## 2022-04-22 NOTE — CASE MANAGEMENT/SOCIAL WORK
Case Management Discharge Note      Final Note: Home.         Selected Continued Care - Discharged on 4/20/2022 Admission date: 4/19/2022 - Discharge disposition: Home or Self Care    Destination    No services have been selected for the patient.              Durable Medical Equipment    No services have been selected for the patient.              Dialysis/Infusion    No services have been selected for the patient.              Home Medical Care    No services have been selected for the patient.              Therapy    No services have been selected for the patient.              Community Resources    No services have been selected for the patient.              Community & DME    No services have been selected for the patient.                       Final Discharge Disposition Code: 01 - home or self-care

## 2023-03-04 PROCEDURE — 36415 COLL VENOUS BLD VENIPUNCTURE: CPT

## 2023-03-04 PROCEDURE — 99283 EMERGENCY DEPT VISIT LOW MDM: CPT

## 2023-03-05 ENCOUNTER — APPOINTMENT (OUTPATIENT)
Dept: CT IMAGING | Facility: HOSPITAL | Age: 58
End: 2023-03-05
Payer: COMMERCIAL

## 2023-03-05 ENCOUNTER — HOSPITAL ENCOUNTER (EMERGENCY)
Facility: HOSPITAL | Age: 58
Discharge: HOME | End: 2023-03-05
Attending: EMERGENCY MEDICINE | Admitting: EMERGENCY MEDICINE
Payer: COMMERCIAL

## 2023-03-05 VITALS
HEART RATE: 88 BPM | DIASTOLIC BLOOD PRESSURE: 77 MMHG | WEIGHT: 193 LBS | TEMPERATURE: 96.9 F | SYSTOLIC BLOOD PRESSURE: 129 MMHG | RESPIRATION RATE: 16 BRPM | OXYGEN SATURATION: 99 % | HEIGHT: 64 IN | BODY MASS INDEX: 32.95 KG/M2

## 2023-03-05 DIAGNOSIS — G51.0 BELL'S PALSY: Primary | ICD-10-CM

## 2023-03-05 PROBLEM — G45.9 TIA (TRANSIENT ISCHEMIC ATTACK): Status: ACTIVE | Noted: 2023-03-05

## 2023-03-05 LAB
ALBUMIN SERPL-MCNC: 4.3 G/DL (ref 3.5–5.2)
ALBUMIN/GLOB SERPL: 1.6 G/DL
ALP SERPL-CCNC: 109 U/L (ref 39–117)
ALT SERPL W P-5'-P-CCNC: 14 U/L (ref 1–33)
ANION GAP SERPL CALCULATED.3IONS-SCNC: 7 MMOL/L (ref 5–15)
APTT PPP: 28.7 SECONDS (ref 22.7–35.4)
AST SERPL-CCNC: 11 U/L (ref 1–32)
BASOPHILS # BLD AUTO: 0.06 10*3/MM3 (ref 0–0.2)
BASOPHILS NFR BLD AUTO: 0.8 % (ref 0–1.5)
BILIRUB SERPL-MCNC: 0.3 MG/DL (ref 0–1.2)
BUN SERPL-MCNC: 16 MG/DL (ref 6–20)
BUN/CREAT SERPL: 22.9 (ref 7–25)
CALCIUM SPEC-SCNC: 9.5 MG/DL (ref 8.6–10.5)
CHLORIDE SERPL-SCNC: 101 MMOL/L (ref 98–107)
CHOLEST SERPL-MCNC: 189 MG/DL (ref 0–200)
CO2 SERPL-SCNC: 29 MMOL/L (ref 22–29)
CREAT SERPL-MCNC: 0.7 MG/DL (ref 0.57–1)
DEPRECATED RDW RBC AUTO: 42.3 FL (ref 37–54)
EGFRCR SERPLBLD CKD-EPI 2021: 102.3 ML/MIN/1.73
EOSINOPHIL # BLD AUTO: 0.29 10*3/MM3 (ref 0–0.4)
EOSINOPHIL NFR BLD AUTO: 4 % (ref 0.3–6.2)
ERYTHROCYTE [DISTWIDTH] IN BLOOD BY AUTOMATED COUNT: 13.3 % (ref 12.3–15.4)
GLOBULIN UR ELPH-MCNC: 2.7 GM/DL
GLUCOSE SERPL-MCNC: 268 MG/DL (ref 65–99)
HBA1C MFR BLD: 11.2 % (ref 4.8–5.6)
HCT VFR BLD AUTO: 37.1 % (ref 34–46.6)
HDLC SERPL-MCNC: 46 MG/DL (ref 40–60)
HGB BLD-MCNC: 12.2 G/DL (ref 12–15.9)
IMM GRANULOCYTES # BLD AUTO: 0.02 10*3/MM3 (ref 0–0.05)
IMM GRANULOCYTES NFR BLD AUTO: 0.3 % (ref 0–0.5)
INR PPP: 0.91 (ref 0.9–1.1)
INR PPP: 0.98 (ref 0.9–1.1)
LDLC SERPL CALC-MCNC: 112 MG/DL (ref 0–100)
LDLC/HDLC SERPL: 2.33 {RATIO}
LYMPHOCYTES # BLD AUTO: 2.57 10*3/MM3 (ref 0.7–3.1)
LYMPHOCYTES NFR BLD AUTO: 35.6 % (ref 19.6–45.3)
MCH RBC QN AUTO: 28.8 PG (ref 26.6–33)
MCHC RBC AUTO-ENTMCNC: 32.9 G/DL (ref 31.5–35.7)
MCV RBC AUTO: 87.5 FL (ref 79–97)
MONOCYTES # BLD AUTO: 0.44 10*3/MM3 (ref 0.1–0.9)
MONOCYTES NFR BLD AUTO: 6.1 % (ref 5–12)
NEUTROPHILS NFR BLD AUTO: 3.84 10*3/MM3 (ref 1.7–7)
NEUTROPHILS NFR BLD AUTO: 53.2 % (ref 42.7–76)
NRBC BLD AUTO-RTO: 0 /100 WBC (ref 0–0.2)
PLATELET # BLD AUTO: 311 10*3/MM3 (ref 140–450)
PMV BLD AUTO: 9.4 FL (ref 6–12)
POTASSIUM SERPL-SCNC: 4.6 MMOL/L (ref 3.5–5.2)
PROT SERPL-MCNC: 7 G/DL (ref 6–8.5)
PROTHROMBIN TIME: 12.4 SECONDS (ref 11.7–14.2)
PROTHROMBIN TIME: 13 SECONDS (ref 11.7–14.2)
QT INTERVAL: 403 MS
RBC # BLD AUTO: 4.24 10*6/MM3 (ref 3.77–5.28)
SODIUM SERPL-SCNC: 137 MMOL/L (ref 136–145)
TRIGL SERPL-MCNC: 179 MG/DL (ref 0–150)
TROPONIN T SERPL HS-MCNC: 13 NG/L
VLDLC SERPL-MCNC: 31 MG/DL (ref 5–40)
WBC NRBC COR # BLD: 7.22 10*3/MM3 (ref 3.4–10.8)

## 2023-03-05 PROCEDURE — 70498 CT ANGIOGRAPHY NECK: CPT

## 2023-03-05 PROCEDURE — 80053 COMPREHEN METABOLIC PANEL: CPT | Performed by: EMERGENCY MEDICINE

## 2023-03-05 PROCEDURE — 93005 ELECTROCARDIOGRAM TRACING: CPT | Performed by: EMERGENCY MEDICINE

## 2023-03-05 PROCEDURE — 80061 LIPID PANEL: CPT | Performed by: NURSE PRACTITIONER

## 2023-03-05 PROCEDURE — 93010 ELECTROCARDIOGRAM REPORT: CPT | Performed by: INTERNAL MEDICINE

## 2023-03-05 PROCEDURE — 99283 EMERGENCY DEPT VISIT LOW MDM: CPT | Performed by: PSYCHIATRY & NEUROLOGY

## 2023-03-05 PROCEDURE — 83036 HEMOGLOBIN GLYCOSYLATED A1C: CPT | Performed by: NURSE PRACTITIONER

## 2023-03-05 PROCEDURE — 85025 COMPLETE CBC W/AUTO DIFF WBC: CPT | Performed by: EMERGENCY MEDICINE

## 2023-03-05 PROCEDURE — 85730 THROMBOPLASTIN TIME PARTIAL: CPT | Performed by: NURSE PRACTITIONER

## 2023-03-05 PROCEDURE — 85610 PROTHROMBIN TIME: CPT | Performed by: NURSE PRACTITIONER

## 2023-03-05 PROCEDURE — 25510000001 IOPAMIDOL PER 1 ML: Performed by: EMERGENCY MEDICINE

## 2023-03-05 PROCEDURE — G0378 HOSPITAL OBSERVATION PER HR: HCPCS

## 2023-03-05 PROCEDURE — 70496 CT ANGIOGRAPHY HEAD: CPT

## 2023-03-05 PROCEDURE — 85610 PROTHROMBIN TIME: CPT | Performed by: EMERGENCY MEDICINE

## 2023-03-05 PROCEDURE — 84484 ASSAY OF TROPONIN QUANT: CPT | Performed by: EMERGENCY MEDICINE

## 2023-03-05 PROCEDURE — 36415 COLL VENOUS BLD VENIPUNCTURE: CPT

## 2023-03-05 RX ORDER — ONDANSETRON 2 MG/ML
4 INJECTION INTRAMUSCULAR; INTRAVENOUS EVERY 6 HOURS PRN
Status: DISCONTINUED | OUTPATIENT
Start: 2023-03-05 | End: 2023-03-05

## 2023-03-05 RX ORDER — ACETAMINOPHEN 500 MG
1000 TABLET ORAL ONCE
Status: COMPLETED | OUTPATIENT
Start: 2023-03-05 | End: 2023-03-05

## 2023-03-05 RX ORDER — ATORVASTATIN CALCIUM 20 MG/1
40 TABLET, FILM COATED ORAL NIGHTLY
Status: DISCONTINUED | OUTPATIENT
Start: 2023-03-05 | End: 2023-03-05

## 2023-03-05 RX ORDER — VALACYCLOVIR HYDROCHLORIDE 1 G/1
1000 TABLET, FILM COATED ORAL 3 TIMES DAILY
Qty: 21 TABLET | Refills: 0 | Status: SHIPPED | OUTPATIENT
Start: 2023-03-05

## 2023-03-05 RX ORDER — SODIUM CHLORIDE 0.9 % (FLUSH) 0.9 %
10 SYRINGE (ML) INJECTION AS NEEDED
Status: DISCONTINUED | OUTPATIENT
Start: 2023-03-05 | End: 2023-03-05 | Stop reason: HOSPADM

## 2023-03-05 RX ORDER — SODIUM CHLORIDE 0.9 % (FLUSH) 0.9 %
10 SYRINGE (ML) INJECTION EVERY 12 HOURS SCHEDULED
Status: DISCONTINUED | OUTPATIENT
Start: 2023-03-05 | End: 2023-03-05

## 2023-03-05 RX ORDER — SODIUM CHLORIDE 9 MG/ML
40 INJECTION, SOLUTION INTRAVENOUS AS NEEDED
Status: DISCONTINUED | OUTPATIENT
Start: 2023-03-05 | End: 2023-03-05

## 2023-03-05 RX ORDER — ASPIRIN 300 MG/1
300 SUPPOSITORY RECTAL DAILY
Status: DISCONTINUED | OUTPATIENT
Start: 2023-03-05 | End: 2023-03-05

## 2023-03-05 RX ORDER — ASPIRIN 325 MG
325 TABLET ORAL DAILY
Status: DISCONTINUED | OUTPATIENT
Start: 2023-03-05 | End: 2023-03-05

## 2023-03-05 RX ORDER — SODIUM CHLORIDE 0.9 % (FLUSH) 0.9 %
10 SYRINGE (ML) INJECTION AS NEEDED
Status: DISCONTINUED | OUTPATIENT
Start: 2023-03-05 | End: 2023-03-05

## 2023-03-05 RX ADMIN — ACETAMINOPHEN 1000 MG: 500 TABLET ORAL at 08:36

## 2023-03-05 RX ADMIN — Medication 10 ML: at 08:29

## 2023-03-05 RX ADMIN — IOPAMIDOL 85 ML: 755 INJECTION, SOLUTION INTRAVENOUS at 07:21

## 2023-03-05 NOTE — ED PROVIDER NOTES
EMERGENCY DEPARTMENT ENCOUNTER    Room Number:  15/15  Date of encounter:  3/5/2023  PCP: System, Provider Not In  Patient Care Team:  System, Provider Not In as PCP - General   Independent Historians: Patient    HPI:  Chief Complaint: Left facial droop, ptosis    A complete HPI/ROS/PMH/PSH/SH/FH are unobtainable due to: None    Chronic or social conditions impacting patient care (Social Determinants of Health): None  (Financial Resource Strain / Food Insecurity / Transportation Needs / Physical Activity / Stress / Social Connections / Intimate Partner Violence / Housing Stability)    Context: Mitra Wade is a 57 y.o. female who presents to the ED c/o left ptosis and facial droop that she noted around 9 PM yesterday.  Patient also reports she had some perioral numbness but that has resolved.    Review of prior external notes (non-ED): None I reviewed patient's discharge summary from 4/20/2022    Review of prior external test results outside of this encounter: I reviewed patient's CBC and CMP from 4/19/2020            PAST MEDICAL HISTORY  Active Ambulatory Problems     Diagnosis Date Noted   • Generalized abdominal pain 04/20/2022   • Type 2 diabetes mellitus, without long-term current use of insulin (HCC) 04/20/2022   • Uncontrolled type 2 diabetes mellitus with hyperglycemia (HCC) 04/20/2022   • Vaginal yeast infection 04/20/2022   • Obesity (BMI 30-39.9) 04/20/2022     Resolved Ambulatory Problems     Diagnosis Date Noted   • No Resolved Ambulatory Problems     Past Medical History:   Diagnosis Date   • Diabetes mellitus (HCC)        The patient qualifies to receive the vaccine, but they have not yet received it.    PAST SURGICAL HISTORY  No past surgical history on file.      FAMILY HISTORY  No family history on file.      SOCIAL HISTORY  Social History     Socioeconomic History   • Marital status:    Tobacco Use   • Smoking status: Never   • Smokeless tobacco: Never   Substance and Sexual Activity   •  Drug use: Never   • Sexual activity: Yes     Partners: Male         ALLERGIES  Patient has no known allergies.        REVIEW OF SYSTEMS  Review of Systems     All systems reviewed and negative except for those discussed in HPI.       PHYSICAL EXAM    I have reviewed the triage vital signs and nursing notes.    ED Triage Vitals   Temp Heart Rate Resp BP SpO2   03/04/23 2252 03/04/23 2252 03/04/23 2252 03/04/23 2252 03/04/23 2252   96.9 °F (36.1 °C) 116 16 (!) 127/102 99 %      Temp src Heart Rate Source Patient Position BP Location FiO2 (%)   03/04/23 2252 03/04/23 2252 03/04/23 2252 03/05/23 0101 --   Tympanic Monitor Standing Left arm        Physical Exam  GENERAL: alert, no acute distress  SKIN: Warm, dry  HENT: Normocephalic, atraumatic  EYES: no scleral icterus  CV: regular rhythm, regular rate  RESPIRATORY: normal effort, lungs clear  ABDOMEN: soft, nontender, nondistended  MUSCULOSKELETAL: no deformity  NEURO: alert, moves all extremities, follows commands, 5 out of 5 strength bilateral upper and lower extremities, subtle left-sided facial droop which spares the forehead, left-sided ptosis noted, speech is clear and fluent          LAB RESULTS  Recent Results (from the past 24 hour(s))   Comprehensive Metabolic Panel    Collection Time: 03/05/23 12:24 AM    Specimen: Arm, Left; Blood   Result Value Ref Range    Glucose 268 (H) 65 - 99 mg/dL    BUN 16 6 - 20 mg/dL    Creatinine 0.70 0.57 - 1.00 mg/dL    Sodium 137 136 - 145 mmol/L    Potassium 4.6 3.5 - 5.2 mmol/L    Chloride 101 98 - 107 mmol/L    CO2 29.0 22.0 - 29.0 mmol/L    Calcium 9.5 8.6 - 10.5 mg/dL    Total Protein 7.0 6.0 - 8.5 g/dL    Albumin 4.3 3.5 - 5.2 g/dL    ALT (SGPT) 14 1 - 33 U/L    AST (SGOT) 11 1 - 32 U/L    Alkaline Phosphatase 109 39 - 117 U/L    Total Bilirubin 0.3 0.0 - 1.2 mg/dL    Globulin 2.7 gm/dL    A/G Ratio 1.6 g/dL    BUN/Creatinine Ratio 22.9 7.0 - 25.0    Anion Gap 7.0 5.0 - 15.0 mmol/L    eGFR 102.3 >60.0 mL/min/1.73    Protime-INR    Collection Time: 03/05/23 12:24 AM    Specimen: Arm, Left; Blood   Result Value Ref Range    Protime 12.4 11.7 - 14.2 Seconds    INR 0.91 0.90 - 1.10   CBC Auto Differential    Collection Time: 03/05/23 12:24 AM    Specimen: Arm, Left; Blood   Result Value Ref Range    WBC 7.22 3.40 - 10.80 10*3/mm3    RBC 4.24 3.77 - 5.28 10*6/mm3    Hemoglobin 12.2 12.0 - 15.9 g/dL    Hematocrit 37.1 34.0 - 46.6 %    MCV 87.5 79.0 - 97.0 fL    MCH 28.8 26.6 - 33.0 pg    MCHC 32.9 31.5 - 35.7 g/dL    RDW 13.3 12.3 - 15.4 %    RDW-SD 42.3 37.0 - 54.0 fl    MPV 9.4 6.0 - 12.0 fL    Platelets 311 140 - 450 10*3/mm3    Neutrophil % 53.2 42.7 - 76.0 %    Lymphocyte % 35.6 19.6 - 45.3 %    Monocyte % 6.1 5.0 - 12.0 %    Eosinophil % 4.0 0.3 - 6.2 %    Basophil % 0.8 0.0 - 1.5 %    Immature Grans % 0.3 0.0 - 0.5 %    Neutrophils, Absolute 3.84 1.70 - 7.00 10*3/mm3    Lymphocytes, Absolute 2.57 0.70 - 3.10 10*3/mm3    Monocytes, Absolute 0.44 0.10 - 0.90 10*3/mm3    Eosinophils, Absolute 0.29 0.00 - 0.40 10*3/mm3    Basophils, Absolute 0.06 0.00 - 0.20 10*3/mm3    Immature Grans, Absolute 0.02 0.00 - 0.05 10*3/mm3    nRBC 0.0 0.0 - 0.2 /100 WBC   ECG 12 Lead Stroke Evaluation    Collection Time: 03/05/23  7:07 AM   Result Value Ref Range    QT Interval 403 ms       Ordered the above labs and independently reviewed the results.        RADIOLOGY  No Radiology Exams Resulted Within Past 24 Hours    I ordered the above noted radiological studies. Reviewed by me and discussed with radiologist.  See dictation for official radiology interpretation.      PROCEDURES    Procedures      MEDICATIONS GIVEN IN ER    Medications   sodium chloride 0.9 % flush 10 mL (has no administration in time range)         ORDERS PLACED DURING THIS VISIT:  Orders Placed This Encounter   Procedures   • CT Angiogram Neck   • CT Angiogram Head   • Comprehensive Metabolic Panel   • Protime-INR   • CBC Auto Differential   • Single High Sensitivity Troponin  T   • Monitor Blood Pressure   • Cardiac Monitoring   • Pulse Oximetry, Continuous   • NIHSS Assessment   • ECG 12 Lead Stroke Evaluation   • Insert Peripheral IV   • CBC & Differential         PROGRESS, DATA ANALYSIS, CONSULTS, AND MEDICAL DECISION MAKING    All labs have been independently interpreted by me.  All radiology studies have been reviewed by me and discussed with radiologist dictating the report.   EKG's independently viewed and interpreted by me.  Discussion below represents my analysis of pertinent findings related to patient's condition, differential diagnosis, treatment plan and final disposition.    Differential diagnosis includes but is not limited to stroke, ICH, Bell's palsy, neuropathy.    ED Course as of 03/05/23 0711   Sun Mar 05, 2023   0657 I discussed patient with Dr. Chahal, stroke neurology, immediately after my assessment.  Recommends CTA head and neck. [TJ]   0709 EKG          EKG time: 0707  Rhythm/Rate: Sinus rhythm rate 76  P waves and AR: Normal  QRS, axis: Narrow regular  ST and T waves: Normal    Interpreted Contemporaneously by me, independently viewed [TJ]      ED Course User Index  [TJ] Noe Rogers MD       I interpreted the cardiac monitor rhythm and my independent interpretation is: normal sinus rhythm.     PPE: The patient wore a mask and I wore an N95 mask throughout the entire patient encounter.       AS OF 07:11 EST VITALS:    BP - 132/85  HR - 80  TEMP - 96.9 °F (36.1 °C) (Tympanic)  O2 SATS - 99%        DIAGNOSIS  Final diagnoses:   Ptosis of left eyelid   Facial droop         DISPOSITION  ED Disposition     None             Note Disclaimer: At [facility], we believe that sharing information builds trust and better relationships. You are receiving this note because you recently visited [facility]. It is possible you will see health information before a provider has talked with you about it. This kind of information can be easy to misunderstand. To help  you fully understand what it means for your health, we urge you to discuss this note with your provider.       Noe Rogers MD  03/05/23 0711

## 2023-03-05 NOTE — ED NOTES
Nursing report ED to floor  Mitra Wade  57 y.o.  female    HPI :   Chief Complaint   Patient presents with    Facial Droop    Numbness       Admitting doctor:   Noe Alvares II, MD    Admitting diagnosis:   The primary encounter diagnosis was Ptosis of left eyelid. Diagnoses of Facial droop and Type 2 diabetes mellitus with other specified complication, unspecified whether long term insulin use (HCC) were also pertinent to this visit.    Code status:   Current Code Status       Date Active Code Status Order ID Comments User Context       3/5/2023 0812 CPR (Attempt to Resuscitate) [id number]  Jada Cornejo APRN ED        Question Answer    Code Status (Patient has no pulse and is not breathing) CPR (Attempt to Resuscitate)    Medical Interventions (Patient has pulse or is breathing) Full Support                    Allergies:   Patient has no known allergies.    Isolation:   No active isolations    Intake and Output  No intake or output data in the 24 hours ending 03/05/23 0826    Weight:       03/04/23  2252   Weight: 87.5 kg (193 lb)       Most recent vitals:   Vitals:    03/05/23 0101 03/05/23 0636 03/05/23 0638 03/05/23 0639   BP: 154/90 132/85     BP Location: Left arm      Patient Position: Sitting      Pulse: 91   80   Resp: 16      Temp:       TempSrc:       SpO2:   99%    Weight:       Height:           Active LDAs/IV Access:   Lines, Drains & Airways       Active LDAs       Name Placement date Placement time Site Days    Peripheral IV 03/05/23 0710 Right Antecubital 03/05/23  0710  Antecubital  less than 1                    Labs (abnormal labs have a star):   Labs Reviewed   COMPREHENSIVE METABOLIC PANEL - Abnormal; Notable for the following components:       Result Value    Glucose 268 (*)     All other components within normal limits    Narrative:     GFR Normal >60  Chronic Kidney Disease <60  Kidney Failure <15     SINGLE HSTROPONIN T - Abnormal; Notable for the following components:    HS  Troponin T 13 (*)     All other components within normal limits    Narrative:     High Sensitive Troponin T Reference Range:  <10.0 ng/L- Negative Female for AMI  <15.0 ng/L- Negative Male for AMI  >=10 - Abnormal Female indicating possible myocardial injury.  >=15 - Abnormal Male indicating possible myocardial injury.   Clinicians would have to utilize clinical acumen, EKG, Troponin, and serial changes to determine if it is an Acute Myocardial Infarction or myocardial injury due to an underlying chronic condition.        PROTIME-INR - Normal   CBC WITH AUTO DIFFERENTIAL - Normal   HEMOGLOBIN A1C   LIPID PANEL   APTT   PROTIME-INR   POCT GLUCOSE FINGERSTICK   POCT GLUCOSE FINGERSTICK   POCT GLUCOSE FINGERSTICK   POCT GLUCOSE FINGERSTICK   CBC AND DIFFERENTIAL    Narrative:     The following orders were created for panel order CBC & Differential.  Procedure                               Abnormality         Status                     ---------                               -----------         ------                     CBC Auto Differential[[id number]]        Normal              Final result                 Please view results for these tests on the individual orders.       EKG:   ECG 12 Lead Stroke Evaluation   Preliminary Result   HEART RATE= 76  bpm   RR Interval= 789  ms   GA Interval= 158  ms   P Horizontal Axis= 14  deg   P Front Axis= 21  deg   QRSD Interval= 99  ms   QT Interval= 403  ms   QRS Axis= -19  deg   T Wave Axis= 5  deg   - OTHERWISE NORMAL ECG -   Sinus rhythm   Borderline left axis deviation   Low voltage, precordial leads   Electronically Signed By:    Date and Time of Study: 2023-03-05 07:07:15          Meds given in ED:   Medications   sodium chloride 0.9 % flush 10 mL (has no administration in time range)   sodium chloride 0.9 % flush 10 mL (has no administration in time range)   sodium chloride 0.9 % flush 10 mL (has no administration in time range)   sodium chloride 0.9 % infusion 40 mL (has  no administration in time range)   atorvastatin (LIPITOR) tablet 40 mg (has no administration in time range)   ondansetron (ZOFRAN) injection 4 mg (has no administration in time range)   aspirin tablet 325 mg (has no administration in time range)     Or   aspirin suppository 300 mg (has no administration in time range)   acetaminophen (TYLENOL) tablet 1,000 mg (has no administration in time range)   iopamidol (ISOVUE-370) 76 % injection 100 mL (85 mL Intravenous Given by Other 3/5/23 0721)       Imaging results:  No radiology results for the last day    Ambulatory status:   - up adlib    Social issues:   Social History     Socioeconomic History    Marital status:    Tobacco Use    Smoking status: Never    Smokeless tobacco: Never   Substance and Sexual Activity    Drug use: Never    Sexual activity: Yes     Partners: Male       NIH Stroke Scale:  Interval: baseline      Registered Nurse, RN  03/05/23 08:26 EST

## 2023-03-05 NOTE — ED TRIAGE NOTES
Pt to ED with complaints of facial swelling and facial droop. Pt's right side of face has some delay compared to L side. Pt complains of numbness in lip on L side. States this started yesterday.

## 2023-03-05 NOTE — CONSULTS
Neurology consult Note    Patient Name: Mitra Wade   MRN: [id number]  Age: 57 y.o.  Sex: female  : 1965    Primary Care Physician: System, Provider Not In  Referring Physician:  No ref. provider found      Handedness: Right    Chief Complaint/Reason for Consultation: Left facial droop    Subjective .  HPI: 57-year-old right-handed female with known diagnosis of diabetes, hyperlipidemia, was noted by the family to have some left facial droop yesterday, which did not improve this morning for which she was brought to the hospital.  They had noticed having some drooping and left eye being smaller.  When I was called by the ER physician, I was told she has left eye ptosis and mild left facial asymmetry.  Denies any focal weakness/numbness/vision changes/slurred speech.      Review of Systems   HENT: Positive for ear pain (Right).    Neurological: Positive for facial asymmetry.   All other systems reviewed and are negative.     Past Medical History:   Diagnosis Date   • Diabetes mellitus (HCC)      No past surgical history on file.  No family history on file.  Social History     Socioeconomic History   • Marital status:    Tobacco Use   • Smoking status: Never   • Smokeless tobacco: Never   Substance and Sexual Activity   • Drug use: Never   • Sexual activity: Yes     Partners: Male     No Known Allergies  Prior to Admission medications    Medication Sig Start Date End Date Taking? Authorizing Provider   Alcohol Swabs (Alcohol Pads) 70 % pads Apply one alcohol swab to injection site of skin immediately prior to insulin injection. Formulary Compliance Approval. 22   Rock Hanna MD   glipizide (Glucotrol) 5 MG tablet Take 1 tablet by mouth 2 (Two) Times a Day Before Meals. 22   Rock Hanna MD   glucose blood test strip Use to test blood glucose up to four times daily as needed. Formulary Compliance Approval. Diagnosis: Type 2 Diabetes - Not Insulin Dependent 22   Rock Hanna MD    Lancets misc Use to test blood glucose up to four times daily as needed. Formulary Compliance Approval. Diagnosis: Type 2 Diabetes - Not Insulin Dependent 4/20/22   Rock Hanna MD   metFORMIN (Glucophage) 500 MG tablet Take 1 tablet by mouth 2 (Two) Times a Day With Meals. 4/20/22   Rock Hanna MD   valACYclovir (VALTREX) 1000 MG tablet Take 1 tablet by mouth 3 (Three) Times a Day. 3/5/23   Trent Cano PA             Objective     Temp:  [96.9 °F (36.1 °C)] 96.9 °F (36.1 °C)  Heart Rate:  [] 88  Resp:  [16] 16  BP: (127-154)/() 129/77  Neurological Exam  Mental Status  Awake, alert and oriented to person, place and time.Alert. Speech is normal. Language is fluent with no aphasia.    Cranial Nerves  CN II: Visual fields full to confrontation.  CN III, IV, VI: Extraocular movements intact bilaterally. Normal lids and orbits bilaterally.  CN V: Facial sensation is normal.  CN VII:  Right: There is peripheral facial weakness.  Left: There is no facial weakness.  CN IX, X: Palate elevates symmetrically  CN XI: Shoulder shrug strength is normal.  CN XII: Tongue midline without atrophy or fasciculations.    Motor  Normal muscle bulk throughout. No fasciculations present. Normal muscle tone. Strength is 5/5 throughout all four extremities.    Sensory  Light touch is normal in upper and lower extremities.     Coordination    No dysmetria.    Gait    Not assessed.      Physical Exam  Vitals and nursing note reviewed.   Constitutional:       Appearance: Normal appearance.   HENT:      Head: Normocephalic and atraumatic.      Mouth/Throat:      Mouth: Mucous membranes are moist.      Pharynx: Oropharynx is clear.   Eyes:      General: Lids are normal.      Extraocular Movements: Extraocular movements intact.   Cardiovascular:      Rate and Rhythm: Normal rate and regular rhythm.   Musculoskeletal:      Cervical back: Normal range of motion and neck supple.   Neurological:      Mental Status: She is alert.       Motor: Motor strength is normal.   Psychiatric:         Mood and Affect: Mood normal.         Speech: Speech normal.         Behavior: Behavior normal.           Hospital Meds:  Scheduled-    Infusions-     PRNs- •  [COMPLETED] Insert Peripheral IV **AND** sodium chloride      Results Reviewed:  I have personally reviewed current lab, radiology, and data   CT head negative for any acute finding  CT angiogram of head and neck shows no significant stenosis/occlusion            Assessment/Plan:      1. Right-sided partial Bell's palsy.  When I examined the patient, actually patient had right sided partial Bell's palsy, rather than left-sided facial droop.  She also complained of right ear pain in the last couple days without any discharge.  Plan to start her on acyclovir, no steroids secondary to her diabetes.  No further neuro work-up at this time.  2. Diabetes mellitus type 2.  Maintain normoglycemia.  Continue home medications.    Case was discussed with patient, her , nursing and the ER physician.  Signing off.  Thank you for the consult.          John Chahal MD  March 5, 2023  10:15 EST

## 2023-03-05 NOTE — ED PROVIDER NOTES
0700: Care assumed from Dr. Rogers pending CT scan.  Patient with left-sided facial droop.    0810: CTA imaging of the head and neck showed no hemorrhage or significant stenosis.  Plan to admit the patient for MRI.    0815: Updated patient on plan for admission.  She is in agreement.    0830: Dr. Chahal, stroke neurologist at bedside.  He suspects patient likely has Bell's palsy.  The patient does not need to be admitted.  He recommends discharging her and sending her home with antivirals.     Trent Cano PA  03/05/23 1163

## 2025-07-29 ENCOUNTER — HOSPITAL ENCOUNTER (EMERGENCY)
Facility: HOSPITAL | Age: 60
Discharge: HOME | End: 2025-07-30
Attending: EMERGENCY MEDICINE | Admitting: EMERGENCY MEDICINE
Payer: COMMERCIAL

## 2025-07-29 DIAGNOSIS — K64.4 INFLAMED EXTERNAL HEMORRHOID: Primary | ICD-10-CM

## 2025-07-29 PROCEDURE — 99283 EMERGENCY DEPT VISIT LOW MDM: CPT

## 2025-07-29 RX ORDER — LIDOCAINE 40 MG/G
1 CREAM TOPICAL ONCE
Status: COMPLETED | OUTPATIENT
Start: 2025-07-30 | End: 2025-07-30

## 2025-07-29 RX ORDER — HYDROCODONE BITARTRATE AND ACETAMINOPHEN 5; 325 MG/1; MG/1
1 TABLET ORAL ONCE
Refills: 0 | Status: COMPLETED | OUTPATIENT
Start: 2025-07-30 | End: 2025-07-30

## 2025-07-29 RX ORDER — HYDROCORTISONE ACETATE 25 MG/1
25 SUPPOSITORY RECTAL ONCE
Status: COMPLETED | OUTPATIENT
Start: 2025-07-30 | End: 2025-07-30

## 2025-07-30 VITALS
HEART RATE: 80 BPM | HEIGHT: 65 IN | SYSTOLIC BLOOD PRESSURE: 118 MMHG | DIASTOLIC BLOOD PRESSURE: 82 MMHG | OXYGEN SATURATION: 100 % | TEMPERATURE: 98 F | RESPIRATION RATE: 20 BRPM | BODY MASS INDEX: 31.42 KG/M2 | WEIGHT: 188.6 LBS

## 2025-07-30 RX ORDER — DIAPER,BRIEF,INFANT-TODD,DISP
1 EACH MISCELLANEOUS 2 TIMES DAILY
Qty: 28 G | Refills: 0 | Status: SHIPPED | OUTPATIENT
Start: 2025-07-30

## 2025-07-30 RX ORDER — HYDROCORTISONE ACETATE 25 MG/1
25 SUPPOSITORY RECTAL 2 TIMES DAILY
Qty: 24 EACH | Refills: 0 | Status: SHIPPED | OUTPATIENT
Start: 2025-07-30 | End: 2025-07-30 | Stop reason: ALTCHOICE

## 2025-07-30 RX ADMIN — LIDOCAINE 4% 1 APPLICATION: 4 CREAM TOPICAL at 00:30

## 2025-07-30 RX ADMIN — HYDROCODONE BITARTRATE AND ACETAMINOPHEN 1 TABLET: 5; 325 TABLET ORAL at 00:18

## 2025-07-30 RX ADMIN — HYDROCORTISONE ACETATE 25 MG: 25 SUPPOSITORY RECTAL at 00:30

## 2025-07-30 NOTE — DISCHARGE INSTRUCTIONS
Follow-up with Dr. Loera and primary care provider.  Take 4 sitz bath's daily.  Use a hydrocortisone suppository twice a day.  You may use the lidocaine cream as needed to help with pain.  Return to emergency department for any worsening symptoms.

## 2025-07-30 NOTE — ED PROVIDER NOTES
MD ATTESTATION NOTE    SHARED VISIT: This visit was performed by BOTH a physician and an APC. The substantive portion of the medical decision making was performed by this attesting physician who made or approved the management plan and takes responsibility for patient management. All studies documented in the APC note (if performed) were independently interpreted by me.    The SEGUNDO and I have discussed this patient's history, physical exam, MDM, and treatment plan.  I have reviewed the documentation and personally had a face to face interaction with the patient. The attached note describes my personal findings.      Mitra Wade is a 60 y.o. female who presents to the ED c/o acute rectal pain.  For about a week worse in the last 24 hours.  Notes some bleeding when she wipes.  Patient has history of internal hemorrhoids.  Patient is anticoagulated.    On exam:  GENERAL: not distressed  HENT: nares patent  EYES: no scleral icterus  CV: regular rhythm, regular rate  RESPIRATORY: normal effort  ABDOMEN: soft  MUSCULOSKELETAL: no deformity  NEURO: alert, moves all extremities, follows commands  SKIN: warm, dry    Labs  No results found for this or any previous visit (from the past 24 hours).    Radiology  No Radiology Exams Resulted Within Past 24 Hours    Medications given in the ED:  Medications   hydrocortisone (ANUSOL-HC) suppository 25 mg (25 mg Rectal Given 7/30/25 0030)   lidocaine (LMX) 4 % cream 1 Application (1 Application Topical Given 7/30/25 0030)   HYDROcodone-acetaminophen (NORCO) 5-325 MG per tablet 1 tablet (1 tablet Oral Given 7/30/25 0018)       Orders placed during this visit:  No orders of the defined types were placed in this encounter.      Medical Decision Making:  ED Course as of 07/30/25 0108 Wed Jul 30, 2025 0101 Reassessed the patient.  She is feeling much improvement with hydrocortisone suppository, topical lidocaine, Norco.  Will prescribe hydrocortisone suppositories.  Will have her  follow-up with colorectal surgery as well as PCP.  Discussed ED return precautions.  She is otherwise well-appearing, hemodynamically stable, and therefore appropriate for discharge. [MP]      ED Course User Index  [MP] Opal Azevedo, LAUREN       Clinical Scores:                                   Differential diagnosis:  Hemorrhoids, GI bleed, rectal bleeding    Diagnosis  Final diagnoses:   Inflamed external hemorrhoid          Noe Rogers MD  07/30/25 0109

## 2025-07-30 NOTE — ED PROVIDER NOTES
EMERGENCY DEPARTMENT ENCOUNTER  Room Number:  08/08  PCP: System, Provider Not In  Independent Historians: Historian: Patient      HPI:  Chief Complaint: had concerns including Rectal Pain.     A complete HPI/ROS/PMH/PSH/SH/FH are unobtainable due to: EM_Unobtainable History : None    Chronic or social conditions impacting patient care (Social Determinants of Health): None      Context: Mitra Wade is a 60 y.o. female with a medical history of diabetes and TIA who presents to the ED c/o acute rectal pain.  Patient reports pain has been present for 1 week, but has worsened over the last 24 hours.  Began to have some bleeding when wiping today.  No associated fever.  Has not been straining for bowel movements.  Prior colonoscopy with noted internal hemorrhoids.  She is not anticoagulated.  Has been using OTC Preparation H.  No other systemic complaints at this time.      Review of prior external notes (non-ED) -and- Review of prior external test results outside of this encounter: Patient seen in office by PCP on 7/21/2025 for toxic effect of carbon monoxide.  Reviewed assessment and plan.  Patient prescribed albuterol inhaler, Lipitor, Tessalon Perles, amoxicillin. Reviewed labs collected on 7/2/2025.  CBC with hemoglobin 12.0, BMP with creatinine 0.67.    Prescription drug monitoring program review:     EM_[place] : N/A    PAST MEDICAL HISTORY  Active Ambulatory Problems     Diagnosis Date Noted    Generalized abdominal pain 04/20/2022    Type 2 diabetes mellitus, without long-term current use of insulin 04/20/2022    Uncontrolled type 2 diabetes mellitus with hyperglycemia 04/20/2022    Vaginal yeast infection 04/20/2022    Obesity (BMI 30-39.9) 04/20/2022    TIA (transient ischemic attack) 03/05/2023     Resolved Ambulatory Problems     Diagnosis Date Noted    No Resolved Ambulatory Problems     Past Medical History:   Diagnosis Date    Diabetes mellitus          PAST SURGICAL HISTORY  No past surgical history on  file.      FAMILY HISTORY  No family history on file.      SOCIAL HISTORY  Social History     Socioeconomic History    Marital status:    Tobacco Use    Smoking status: Never    Smokeless tobacco: Never   Substance and Sexual Activity    Drug use: Never    Sexual activity: Yes     Partners: Male         ALLERGIES  Metformin      REVIEW OF SYSTEMS  Included in HPI  All systems reviewed and negative except for those discussed in HPI.      PHYSICAL EXAM    I have reviewed the triage vital signs and nursing notes.    ED Triage Vitals [07/29/25 2204]   Temp Heart Rate Resp BP SpO2   97.9 °F (36.6 °C) 118 20 133/87 98 %      Temp src Heart Rate Source Patient Position BP Location FiO2 (%)   Oral -- -- -- --       Physical Exam  Constitutional:       General: She is not in acute distress.     Appearance: She is well-developed.   HENT:      Head: Normocephalic and atraumatic.   Eyes:      Extraocular Movements: Extraocular movements intact.   Cardiovascular:      Rate and Rhythm: Normal rate and regular rhythm.      Heart sounds: Normal heart sounds.   Pulmonary:      Effort: Pulmonary effort is normal.      Breath sounds: Normal breath sounds.   Abdominal:      General: There is no distension.   Genitourinary:     Comments: Exam chaperoned by ANTONIO Lopez extern.  There are multiple hemorrhoids surrounding the anus.  Hemorrhoid at 12 o'clock position is inflamed with mild ulceration and scant bleeding.  This is tender to palpation.  Nonthrombosed  Skin:     General: Skin is warm.   Neurological:      General: No focal deficit present.      Mental Status: She is alert and oriented to person, place, and time.   Psychiatric:         Mood and Affect: Mood normal.             MEDICATIONS GIVEN IN ER  Medications   hydrocortisone (ANUSOL-HC) suppository 25 mg (25 mg Rectal Given 7/30/25 0030)   lidocaine (LMX) 4 % cream 1 Application (1 Application Topical Given 7/30/25 0030)   HYDROcodone-acetaminophen (NORCO) 5-325 MG per  tablet 1 tablet (1 tablet Oral Given 7/30/25 0018)         ORDERS PLACED DURING THIS VISIT:  No orders of the defined types were placed in this encounter.        OUTPATIENT MEDICATION MANAGEMENT:  No current Epic-ordered facility-administered medications on file.     Current Outpatient Medications Ordered in Epic   Medication Sig Dispense Refill    Alcohol Swabs (Alcohol Pads) 70 % pads Apply one alcohol swab to injection site of skin immediately prior to insulin injection. Formulary Compliance Approval. 100 each 0    benzonatate (TESSALON) 200 MG capsule Take 1 capsule by mouth 3 (Three) Times a Day As Needed for Cough. 15 capsule 0    glipizide (Glucotrol) 5 MG tablet Take 1 tablet by mouth 2 (Two) Times a Day Before Meals. 60 tablet 0    glucose blood test strip Use to test blood glucose up to four times daily as needed. Formulary Compliance Approval. Diagnosis: Type 2 Diabetes - Not Insulin Dependent 100 each 0    hydrocortisone (ANUSOL-HC) 25 MG suppository Insert 1 suppository into the rectum 2 (Two) Times a Day. 24 each 0    Lancets misc Use to test blood glucose up to four times daily as needed. Formulary Compliance Approval. Diagnosis: Type 2 Diabetes - Not Insulin Dependent 100 each 0    metFORMIN (Glucophage) 500 MG tablet Take 1 tablet by mouth 2 (Two) Times a Day With Meals. 60 tablet 0    valACYclovir (VALTREX) 1000 MG tablet Take 1 tablet by mouth 3 (Three) Times a Day. 21 tablet 0           PROGRESS, DATA ANALYSIS, CONSULTS, AND MEDICAL DECISION MAKING  All labs have been independently interpreted by me.  All radiology studies have been reviewed by me. All EKG's have been independently viewed and interpreted by me.  Discussion below represents my analysis of pertinent findings related to patient's condition, differential diagnosis, treatment plan and final disposition.    Differential diagnosis includes but is not limited to external hemorrhoid, internal hemorrhoid, rectal prolapse.        ED Course  as of 07/30/25 0102 Wed Jul 30, 2025   0101 Reassessed the patient.  She is feeling much improvement with hydrocortisone suppository, topical lidocaine, Norco.  Will prescribe hydrocortisone suppositories.  Will have her follow-up with colorectal surgery as well as PCP.  Discussed ED return precautions.  She is otherwise well-appearing, hemodynamically stable, and therefore appropriate for discharge. [MP]      ED Course User Index  [MP] Opal Azevedo PA-C             AS OF 01:02 EDT VITALS:    BP - 133/87  HR - 118  TEMP - 97.9 °F (36.6 °C) (Oral)  O2 SATS - 98%    COMPLEXITY OF CARE  Admission was considered but after careful review of the patient's presentation, physical examination, diagnostic results, and response to treatment the patient may be safely discharged with outpatient follow-up.      DIAGNOSIS  Final diagnoses:   Inflamed external hemorrhoid         DISPOSITION  ED Disposition       ED Disposition   Discharge    Condition   Stable    Comment   --                Please note that portions of this document were completed with a voice recognition program.    Note Disclaimer: At [facility], we believe that sharing information builds trust and better relationships. You are receiving this note because you recently visited [facility]. It is possible you will see health information before a provider has talked with you about it. This kind of information can be easy to misunderstand. To help you fully understand what it means for your health, we urge you to discuss this note with your provider.     Opal Azevedo PA-C  07/30/25 0102

## 2025-07-30 NOTE — ED TRIAGE NOTES
Patient to ed from  with complaints of rectal pain that she believes to be hemorrhoids x 7 days with no improvement with OTC